# Patient Record
(demographics unavailable — no encounter records)

---

## 2024-11-14 NOTE — REVIEW OF SYSTEMS
[Negative] : Heme/Lymph [FreeTextEntry8] : LMP 11/5.  Has IUD, no plans for pregnancy. [de-identified] : Noise intolerance.

## 2024-11-14 NOTE — HISTORY OF PRESENT ILLNESS
[FreeTextEntry1] : SYDNIE BISHOP is a 41 year old female with longstanding HTN on med for 17 years.  On current med regimen for about 9 months.  Chronic diff to control BP with SBP > 160. Similarly elevated at home when she checks it on occasion.   No history of CVD.  She complains of occas chest pressure.  Can occur at rest or with physical exertion.   No SOB.  Some headache for 2 weeks and dizziness.  Headache is dull and intermittent. No lower ext swelling. She reports good compliance with meds.  No exercise. + bilat knee pains with exercise. She takes low dose aspirin because her "blood is thick," as prescribed by her doctor.  No history of stroke or TIA. No history of blood clot. No hist of preeclampsia or GDM. She did have high blood pressure during last pregnancy. No snoring.  Soc - Never smoked.  No alcohol. Fam hist - Mother - HTN.

## 2024-11-14 NOTE — PHYSICAL EXAM
[Well Developed] : well developed [Well Nourished] : well nourished [No Acute Distress] : no acute distress [Normal Conjunctiva] : normal conjunctiva [Normal Venous Pressure] : normal venous pressure [No Carotid Bruit] : no carotid bruit [Normal S1, S2] : normal S1, S2 [No Rub] : no rub [No Gallop] : no gallop [Clear Lung Fields] : clear lung fields [Good Air Entry] : good air entry [No Respiratory Distress] : no respiratory distress  [Soft] : abdomen soft [Non Tender] : non-tender [No Masses/organomegaly] : no masses/organomegaly [Normal Bowel Sounds] : normal bowel sounds [Normal Gait] : normal gait [No Edema] : no edema [No Cyanosis] : no cyanosis [No Clubbing] : no clubbing [No Varicosities] : no varicosities [No Rash] : no rash [No Skin Lesions] : no skin lesions [Moves all extremities] : moves all extremities [No Focal Deficits] : no focal deficits [Normal Speech] : normal speech [Alert and Oriented] : alert and oriented [Normal memory] : normal memory [Murmur] : murmur [de-identified] : III/VI IKER base with rad to carotids.

## 2024-11-14 NOTE — DISCUSSION/SUMMARY
[FreeTextEntry1] : Advised diet, exercise and wt loss, low sodium diet. Home BP monitoring TIW, and bring in log next visit Advised needs more mediation at this time. Explained fetal risk from ARB. She is in agreement with starting. Rx Losartan 50 mg QD. Contin other current meds. Follow up 1 mo. Consider change metoprolol to labetalol. Check labs incl Lester and renin levels.  Sched echo to eval murmur Renal artery duplex rule out VANNESSA. [EKG obtained to assist in diagnosis and management of assessed problem(s)] : EKG obtained to assist in diagnosis and management of assessed problem(s)

## 2024-12-17 NOTE — DISCUSSION/SUMMARY
[FreeTextEntry1] : Advised contin current meds. Again advised if pregnancy is potential to let me know immediately for med adjustment as ARB potential teratogenic. Strongly advised lifestyle modification - diet, exercise and wt loss. Follow up 3 mo

## 2024-12-17 NOTE — HISTORY OF PRESENT ILLNESS
[FreeTextEntry1] : Here for follow up. Echo today prelim reviewed with patient. No new complaints. BP better. Labs from Nov reviewed with patient.

## 2025-03-18 NOTE — HISTORY OF PRESENT ILLNESS
[FreeTextEntry1] : Here for follow up. Compl of chest pressure intermittently, and fatigue. Sleeps well at night. Not exercising. No SOB or other symptoms.

## 2025-03-18 NOTE — DISCUSSION/SUMMARY
[FreeTextEntry1] : Schedule exercise stress test to eval chest pressure. Decrease metoprolol to 50 mg QD advised due to fatigue and well controlled HTN. Other meds same Follow up after stress test. Contin efforts for diet, exercise and wt loss